# Patient Record
Sex: MALE | Race: WHITE | NOT HISPANIC OR LATINO | URBAN - METROPOLITAN AREA
[De-identification: names, ages, dates, MRNs, and addresses within clinical notes are randomized per-mention and may not be internally consistent; named-entity substitution may affect disease eponyms.]

---
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
## 2024-05-14 NOTE — BRIEF OPERATIVE NOTE - ELECTIVE PROCEDURE
5 month follow up appt from 5/10 visit scheduled for 10/11 at 10:30 am in person with non-fasting labs scheduled at convenient [facility] clinic on 10/10. Pt's daughter (Leila) in agreement. Lab orders needed.  Lizabeth Molina,  Nephrology Clinic Navigator  [facility]  Direct: [phone].         No

## (undated) DEVICE — SUCTION YANKAUER TAPERED BULBOUS NO VENT

## (undated) DEVICE — Device

## (undated) DEVICE — DRAPE 3/4 SHEET 52X76"

## (undated) DEVICE — BLADE SURGICAL #11 CARBON

## (undated) DEVICE — PREP BETADINE KIT

## (undated) DEVICE — SOL IRR POUR NS 0.9% 500ML

## (undated) DEVICE — DRAPE IOBAN 33" X 23"

## (undated) DEVICE — SOL IRR POUR H2O 500ML

## (undated) DEVICE — SUT ETHILON 4-0 18" P-3

## (undated) DEVICE — GLV 7.5 PROTEXIS (WHITE)

## (undated) DEVICE — PACK PEDIATRIC MINOR

## (undated) DEVICE — DRSG COMBINE 5X9"

## (undated) DEVICE — SUT VICRYL 4-0 27" RB-1 UNDYED

## (undated) DEVICE — TUBING SUCTION NONCONDUCTIVE 6MM X 12FT

## (undated) DEVICE — ELCTR GROUNDING PAD INFANT COVIDIEN

## (undated) DEVICE — PACKING GAUZE IODOFORM 0.5"

## (undated) DEVICE — ELCTR OLSEN TIP

## (undated) DEVICE — DRAIN PENROSE .25" X 18" LATEX

## (undated) DEVICE — GOWN LG

## (undated) DEVICE — DRSG TELFA 3 X 8

## (undated) DEVICE — DRSG CURITY GAUZE SPONGE 4 X 4" 12-PLY

## (undated) DEVICE — POSITIONER PATIENT SAFETY STRAP 3X60"

## (undated) DEVICE — DRAPE INSTRUMENT POUCH 6.75" X 11"

## (undated) DEVICE — FRAZIER SUCTION TIP 8FR